# Patient Record
Sex: FEMALE | Race: WHITE
[De-identification: names, ages, dates, MRNs, and addresses within clinical notes are randomized per-mention and may not be internally consistent; named-entity substitution may affect disease eponyms.]

---

## 2019-01-04 ENCOUNTER — HOSPITAL ENCOUNTER (INPATIENT)
Dept: HOSPITAL 91 - PP1 | Age: 24
LOS: 1 days | Discharge: HOME | DRG: 833 | End: 2019-01-05
Payer: COMMERCIAL

## 2019-01-04 ENCOUNTER — HOSPITAL ENCOUNTER (INPATIENT)
Dept: HOSPITAL 10 - OBT | Age: 24
LOS: 1 days | Discharge: HOME | DRG: 833 | End: 2019-01-05
Attending: OBSTETRICS & GYNECOLOGY | Admitting: OBSTETRICS & GYNECOLOGY
Payer: COMMERCIAL

## 2019-01-04 VITALS
WEIGHT: 149.25 LBS | WEIGHT: 149.25 LBS | HEIGHT: 64 IN | HEIGHT: 64 IN | BODY MASS INDEX: 25.48 KG/M2 | BODY MASS INDEX: 25.48 KG/M2

## 2019-01-04 VITALS — SYSTOLIC BLOOD PRESSURE: 114 MMHG | DIASTOLIC BLOOD PRESSURE: 70 MMHG

## 2019-01-04 DIAGNOSIS — Z3A.34: ICD-10-CM

## 2019-01-04 DIAGNOSIS — O41.03X0: Primary | ICD-10-CM

## 2019-01-04 LAB — RUPTURE FETAL MEMBRANES: NEGATIVE

## 2019-01-04 PROCEDURE — 96365 THER/PROPH/DIAG IV INF INIT: CPT

## 2019-01-04 PROCEDURE — 84112 EVAL AMNIOTIC FLUID PROTEIN: CPT

## 2019-01-04 PROCEDURE — 76818 FETAL BIOPHYS PROFILE W/NST: CPT

## 2019-01-04 PROCEDURE — G0463 HOSPITAL OUTPT CLINIC VISIT: HCPCS

## 2019-01-04 RX ADMIN — PYRIDOXINE HYDROCHLORIDE SCH MLS/HR: 100 INJECTION, SOLUTION INTRAMUSCULAR; INTRAVENOUS at 17:25

## 2019-01-04 RX ADMIN — PYRIDOXINE HYDROCHLORIDE SCH MLS/HR: 100 INJECTION, SOLUTION INTRAMUSCULAR; INTRAVENOUS at 23:36

## 2019-01-04 RX ADMIN — BETAMETHASONE SODIUM PHOSPHATE AND BETAMETHASONE ACETATE 1 MG: 3; 3 INJECTION, SUSPENSION INTRA-ARTICULAR; INTRALESIONAL; INTRAMUSCULAR at 19:17

## 2019-01-04 RX ADMIN — BETAMETHASONE SODIUM PHOSPHATE AND BETAMETHASONE ACETATE SCH MG: 3; 3 INJECTION, SUSPENSION INTRA-ARTICULAR; INTRALESIONAL; INTRAMUSCULAR at 19:17

## 2019-01-04 RX ADMIN — PYRIDOXINE HYDROCHLORIDE 1 MLS/HR: 100 INJECTION, SOLUTION INTRAMUSCULAR; INTRAVENOUS at 23:36

## 2019-01-04 RX ADMIN — PYRIDOXINE HYDROCHLORIDE 1 MLS/HR: 100 INJECTION, SOLUTION INTRAMUSCULAR; INTRAVENOUS at 17:25

## 2019-01-05 RX ADMIN — PYRIDOXINE HYDROCHLORIDE 1 MLS/HR: 100 INJECTION, SOLUTION INTRAMUSCULAR; INTRAVENOUS at 06:22

## 2019-01-05 RX ADMIN — Medication 1 TAB: at 10:36

## 2019-01-05 RX ADMIN — BETAMETHASONE SODIUM PHOSPHATE AND BETAMETHASONE ACETATE SCH MG: 3; 3 INJECTION, SUSPENSION INTRA-ARTICULAR; INTRALESIONAL; INTRAMUSCULAR at 18:20

## 2019-01-05 RX ADMIN — PYRIDOXINE HYDROCHLORIDE SCH MLS/HR: 100 INJECTION, SOLUTION INTRAMUSCULAR; INTRAVENOUS at 06:22

## 2019-01-05 RX ADMIN — BETAMETHASONE SODIUM PHOSPHATE AND BETAMETHASONE ACETATE 1 MG: 3; 3 INJECTION, SUSPENSION INTRA-ARTICULAR; INTRALESIONAL; INTRAMUSCULAR at 18:20

## 2019-01-05 NOTE — HP
Date/Time of Note


Date/Time of Note


DATE: 19 


TIME: 16:26





OB - History


Hx of Present Pregnancy


Free Text/Dictation


23-year-old female  3 para 1 at 34+ weeks gestation admitted because of 


oligohydramnios for IV hydration


Last Menstrual Period:  May 7, 2018


Estimated Due Date:  2019


:  3


Para:  1


Spontaneous :  1


Prenatal Care:  Good Care


Ultrasounds:  Normal mid trimester US


Obstetrical Complications:  Other (Separation of amnion and chorion)





Past Family/Social History


*


Past Medical, Surgical, Family and Obstetric Histories reviewed from prenatal 


chart.


Blood Type:  O+


Rubella:  not immune


RPR/VDRL:  Negative


GBS Status:  Unknown


HBsAG:  Negative





OB  Admission Exam


Vital Signs


Vital Signs





Vital Signs


  Date      Temp  Pulse  Resp  B/P (MAP)   Pulse Ox  O2          O2 Flow    FiO2


Time                                                 Delivery    Rate


    19  98.0                   114/70            Room Air


     16:54                           (85)








Physical Exam


HEENT:  WNL


Heart:  Rhythm Normal


Lungs:  Clear, Equal


Abdomen:  WNL


Extremities:  Normal


Reflexes:  Normal


Cervical Dilatation:  None


Effacement:  0%


Station:  Ballotable


Membranes:  Intact


Fetal Heart Rate:  140's


Accelerations:  Accelerations Present


Decelerations:  No Decelerations


Varibility:  Moderate





OB  Assessment/Plan


Reason for admission:  other (Oligohydramnios)


Other Assessment:


34+ weeks gestation


Oligohydramnios


Amnion and chorion separation


Other plan:


Patient was admitted for IV hydration


Recheck amniotic fluid following the











PING CHAO MD    2019 16:33

## 2019-01-05 NOTE — DS
Date/Time of Note


Date/Time of Note


Home today with follow-up with antepartum testing unit of [facility] on Monday or Tuesday


DATE: 19 


TIME: 17:26





Obstetrical Discharge Record


Final Diagnosis


Final Diagnosis:   not delivered


Other Final Diagnosis


34+ weeks gestation 


decreased amniotic fluid





Complications


Other (Separation of amnion and chorion, oligohydramnios)





Condition on Discharge


Physical Assessment


Voiding:  Yes


Bowel Movement:  Yes


Breast:  Soft, non-tender, Filling


Fundus:  Other (Gravid)


Abdomen and Incision:


Abdomen appears to be pregnant fetal heart tones are reactive


Episiotomy:


Not applicable


Calf Tenderness:  No


Patient Condition:  Good (Repeat DARRELL today was more than 10 cm)











PING CHAO MD    2019 17:28

## 2019-01-05 NOTE — PD.PPDC
OB/GYN Discharge Instruction


Provider Information


Physician Information


23-year-old female admitted with diagnosis of oligohydramnios which was resolved


the following day





Diagnosis


       [id number]
Final Diagnosis:  [id number]
Status post oligohydramnios








Condition


                 [id number]
Patient Condition:  [id number]
Good








Diet


                [id number]
Diet:  Cdodp4n
Resume Regular Diet








Activity/Restrictions


                    [id number]
Activity:  [id number]
Bedrest


                                          May Shower








Follow-up


Follow-up with Physician:  2, Day/Days (In antepartum testing unit for 


antepartum testing)





Return to clinic for


Comment:


Refer back to OB triage for decreased fetal move











PING CHAO MD    Jan 5, 2019 16:44

## 2019-01-08 NOTE — NSTRPT
===================================

NST Information

===================================

Datetime Report Generated by CPN: 01/08/2019 14:09

   

   

===========================

Datetime: 01/04/2019 14:24

===========================

   

   

===================================

NST Information

===================================

   

 EGA:  34.4

 Test Number:  5

 Time on Monitor:  01/04/2019 14:41

 Time off Monitor:  01/04/2019 15:03

 NST Duration (Min):  22

 Reason for NST:  Other

   Reason for NST Other:  Unfused Amnion _ Chorion

 Test and Monitor Explained:  Monitor Explained; Test Explained; Verbalized Understanding

 Pulse:  97

 Resp:  18

 SBP:  112

 DBP:  63

   

===================================

Test Evaluation

===================================

   

 NST Interventions:  None

 Patient States Fetal Movement:  Present

 Contraction Frequency:  none

 FHR Baseline :  130

 Variability:  Moderate 6-25bpm

 Accelerations:  15X15

 Decelerations:  Variable

 FHR Category:  Category II

 NST Results:  Reactive

 Provider Notified:  Dr reyes reviewed us report _ strip/Dr Jason





 Comments:  pt to u/s darrell 4.2 cm cephalic

   darrell on 12/17 was 11.0 cm

   variable decel x 2, ajit 110 bpm _ 70 bpm

   Dr Reyes reviewed strip _ wet reading of us-recommends pt be sent to the hospital for IV hydration


   1538 Dr Jason given report re Dr Reyes recommendation, DARRELL 4.2 cm, variable decel x 2-orders rece
ived

   1540 Report to KEISHA Gibson RN in triage, preliminary us report _ prenatals faxed to triage. Pt verbal
izes understanding _ willingness to comply with recommendation

   

===================================

Electronically Signed By

===================================

   

 E-Signature:  Electronically signed by Kati Reyes MD on 1/7/2019 at 14:03  with User ID: [id number]

   

===========================

Datetime: 12/17/2018 09:59

===========================

   

   

===================================

NST Information

===================================

   

 EGA:  32.0

 NST Duration (Min):  35

   

===========================

Datetime: 12/13/2018 14:08

===========================

   

   

===================================

NST Information

===================================

   

 EGA:  31.3

 NST Duration (Min):  26

   

===========================

Datetime: 12/11/2018 14:22

===========================

   

   

===================================

NST Information

===================================

   

 EGA:  31.1

 NST Duration (Min):  21

   

===========================

Datetime: 11/30/2018 13:50

===========================

   

   

===================================

NST Information

===================================

   

 EGA:  29.4

 NST Duration (Min):  46

   

===========================

Datetime: 11/27/2018 13:42

===========================

   

   

===================================

NST Information

===================================

   

 EGA:  29.1

   

===========================

Datetime: 11/27/2018 13:10

===========================

   

 NST Duration (Min):  26

## 2019-01-31 ENCOUNTER — HOSPITAL ENCOUNTER (OUTPATIENT)
Dept: HOSPITAL 91 - OBT | Age: 24
Discharge: HOME | End: 2019-01-31
Payer: COMMERCIAL

## 2019-01-31 DIAGNOSIS — Z3A.38: ICD-10-CM

## 2019-01-31 DIAGNOSIS — O34.219: Primary | ICD-10-CM

## 2019-01-31 PROCEDURE — 76815 OB US LIMITED FETUS(S): CPT

## 2019-01-31 PROCEDURE — 76818 FETAL BIOPHYS PROFILE W/NST: CPT
